# Patient Record
Sex: MALE | Race: WHITE | NOT HISPANIC OR LATINO
[De-identification: names, ages, dates, MRNs, and addresses within clinical notes are randomized per-mention and may not be internally consistent; named-entity substitution may affect disease eponyms.]

---

## 2018-10-23 ENCOUNTER — RX RENEWAL (OUTPATIENT)
Age: 73
End: 2018-10-23

## 2018-10-23 PROBLEM — Z00.00 ENCOUNTER FOR PREVENTIVE HEALTH EXAMINATION: Status: ACTIVE | Noted: 2018-10-23

## 2018-12-11 ENCOUNTER — APPOINTMENT (OUTPATIENT)
Dept: ENDOCRINOLOGY | Facility: CLINIC | Age: 73
End: 2018-12-11

## 2019-03-04 ENCOUNTER — MEDICATION RENEWAL (OUTPATIENT)
Age: 74
End: 2019-03-04

## 2019-03-26 ENCOUNTER — RECORD ABSTRACTING (OUTPATIENT)
Age: 74
End: 2019-03-26

## 2019-03-26 DIAGNOSIS — Z86.79 PERSONAL HISTORY OF OTHER DISEASES OF THE CIRCULATORY SYSTEM: ICD-10-CM

## 2019-03-26 DIAGNOSIS — Z79.4 LONG TERM (CURRENT) USE OF INSULIN: ICD-10-CM

## 2019-03-26 DIAGNOSIS — Z86.39 PERSONAL HISTORY OF OTHER ENDOCRINE, NUTRITIONAL AND METABOLIC DISEASE: ICD-10-CM

## 2019-03-26 DIAGNOSIS — Z87.39 PERSONAL HISTORY OF OTHER DISEASES OF THE MUSCULOSKELETAL SYSTEM AND CONNECTIVE TISSUE: ICD-10-CM

## 2019-03-26 DIAGNOSIS — Z78.9 OTHER SPECIFIED HEALTH STATUS: ICD-10-CM

## 2019-03-26 DIAGNOSIS — Z83.3 FAMILY HISTORY OF DIABETES MELLITUS: ICD-10-CM

## 2019-03-26 DIAGNOSIS — Z87.19 PERSONAL HISTORY OF OTHER DISEASES OF THE DIGESTIVE SYSTEM: ICD-10-CM

## 2019-03-26 DIAGNOSIS — Z80.9 FAMILY HISTORY OF MALIGNANT NEOPLASM, UNSPECIFIED: ICD-10-CM

## 2019-03-26 DIAGNOSIS — Z87.898 PERSONAL HISTORY OF OTHER SPECIFIED CONDITIONS: ICD-10-CM

## 2019-03-26 DIAGNOSIS — Z87.448 PERSONAL HISTORY OF OTHER DISEASES OF URINARY SYSTEM: ICD-10-CM

## 2019-03-26 DIAGNOSIS — Z87.09 PERSONAL HISTORY OF OTHER DISEASES OF THE RESPIRATORY SYSTEM: ICD-10-CM

## 2019-03-26 RX ORDER — TIOTROPIUM BROMIDE 18 UG/1
18 CAPSULE ORAL; RESPIRATORY (INHALATION)
Refills: 0 | Status: ACTIVE | COMMUNITY

## 2019-03-26 RX ORDER — ATORVASTATIN CALCIUM 10 MG/1
10 TABLET, FILM COATED ORAL
Refills: 0 | Status: ACTIVE | COMMUNITY

## 2019-03-27 ENCOUNTER — APPOINTMENT (OUTPATIENT)
Dept: ENDOCRINOLOGY | Facility: CLINIC | Age: 74
End: 2019-03-27
Payer: MEDICARE

## 2019-03-27 VITALS
BODY MASS INDEX: 33.18 KG/M2 | SYSTOLIC BLOOD PRESSURE: 112 MMHG | HEART RATE: 80 BPM | WEIGHT: 237 LBS | HEIGHT: 71 IN | OXYGEN SATURATION: 97 % | DIASTOLIC BLOOD PRESSURE: 70 MMHG

## 2019-03-27 LAB — GLUCOSE BLDC GLUCOMTR-MCNC: 178

## 2019-03-27 PROCEDURE — 99214 OFFICE O/P EST MOD 30 MIN: CPT

## 2019-03-27 RX ORDER — LANCETS 33 GAUGE
EACH MISCELLANEOUS
Qty: 1 | Refills: 2 | Status: ACTIVE | COMMUNITY
Start: 2019-03-27 | End: 1900-01-01

## 2019-03-28 ENCOUNTER — RESULT REVIEW (OUTPATIENT)
Age: 74
End: 2019-03-28

## 2019-03-28 LAB
BASOPHILS # BLD AUTO: 0.01 K/UL
BASOPHILS NFR BLD AUTO: 0.1 %
EOSINOPHIL # BLD AUTO: 0 K/UL
EOSINOPHIL NFR BLD AUTO: 0 %
HCT VFR BLD CALC: 37.5 %
HGB BLD-MCNC: 12 G/DL
IMM GRANULOCYTES NFR BLD AUTO: 0.5 %
LYMPHOCYTES # BLD AUTO: 1.22 K/UL
LYMPHOCYTES NFR BLD AUTO: 7.9 %
MAN DIFF?: NORMAL
MCHC RBC-ENTMCNC: 32 GM/DL
MCHC RBC-ENTMCNC: 32.7 PG
MCV RBC AUTO: 102.2 FL
MONOCYTES # BLD AUTO: 0.38 K/UL
MONOCYTES NFR BLD AUTO: 2.5 %
NEUTROPHILS # BLD AUTO: 13.71 K/UL
NEUTROPHILS NFR BLD AUTO: 89 %
PLATELET # BLD AUTO: 267 K/UL
RBC # BLD: 3.67 M/UL
RBC # FLD: 12.4 %
WBC # FLD AUTO: 15.4 K/UL

## 2019-03-28 NOTE — ASSESSMENT
[FreeTextEntry1] : 75 y/o male with Type 2 DM, HTN, and Hyperlipidemia. Labs reviewed from 3/26/19 - A1C 6.9%\par \par Plan: \par Type 2 DM: labs now  \par - continue current medication regimen: Toujeo U300 in am  if blood sugar <100 20 units, 100-130 25 units, > 130 30 units\par Glipizide 5 mg daily \par - if blood sugar > 200 give 34 units of Toujeo, while on Prednisone\par - send in logs in 1 week\par - continue self BS monitoring\par - educated on healthy food choices\par \par HTN: stable. continue Losartan Potassium\par \par Hyperlipidemia: monitor labs, continue Atorvastatin\par \par Labs and follow up visit in 3 months.

## 2019-03-28 NOTE — PHYSICAL EXAM
[Alert] : alert [No Acute Distress] : no acute distress [Well Nourished] : well nourished [Well Developed] : well developed [Normal Sclera/Conjunctiva] : normal sclera/conjunctiva [EOMI] : extra ocular movement intact [Normal Hearing] : hearing was normal [Supple] : the neck was supple [No LAD] : no lymphadenopathy [Thyroid Not Enlarged] : the thyroid was not enlarged [No Thyroid Nodules] : there were no palpable thyroid nodules [Normal Rate and Effort] : normal respiratory rhythm and effort [No Accessory Muscle Use] : no accessory muscle use [Clear to Auscultation] : lungs were clear to auscultation bilaterally [Normal Rate] : heart rate was normal  [Normal S1, S2] : normal S1 and S2 [Regular Rhythm] : with a regular rhythm [Pedal Pulses Normal] : the pedal pulses are present [Normal Bowel Sounds] : normal bowel sounds [Not Tender] : non-tender [Soft] : abdomen soft [Acanthosis Nigricans] : no acanthosis nigricans [Right Foot Was Examined] : right foot ~C was examined [Left Foot Was Examined] : left foot ~C was examined [Swelling] : swollen [Tenderness] : not tender [Erythema] : not erythematous [Normal] : normal [Diminished Throughout Both Feet] : normal tactile sensation with monofilament testing throughout both feet [No Tremors] : no tremors [Oriented x3] : oriented to person, place, and time [Normal Insight/Judgement] : insight and judgment were intact [Normal Mood] : the mood was normal [de-identified] : 1+ edema in right foot  [FreeTextEntry1] : 1+ edema

## 2019-03-28 NOTE — REVIEW OF SYSTEMS
[SOB on Exertion] : shortness of breath during exertion [Polyuria] : polyuria [Fatigue] : no fatigue [Decreased Appetite] : appetite not decreased [Recent Weight Gain (___ Lbs)] : no recent weight gain [Recent Weight Loss (___ Lbs)] : no recent weight loss [Visual Field Defect] : no visual field defect [Blurry Vision] : no blurred vision [Dysphagia] : no dysphagia [Dysphonia] : no dysphonia [Neck Pain] : no neck pain [Chest Pain] : no chest pain [Palpitations] : no palpitations [Constipation] : no constipation [Diarrhea] : no diarrhea [Dysuria] : no dysuria [Headache] : no headaches [Tremors] : no tremors [Depression] : no depression [Anxiety] : no anxiety [Polydipsia] : no polydipsia [Cold Intolerance] : cold tolerant [Heat Intolerance] : heat tolerant [Easy Bruising] : no tendency for easy bruising [Swelling] : no swelling [FreeTextEntry2] : weight stable  [FreeTextEntry6] : hx of emphysema

## 2019-03-28 NOTE — HISTORY OF PRESENT ILLNESS
[FreeTextEntry1] : Pt. was hospitalized for 3 days at [facility] for gout. The gout was in his right foot, knee and hip. He was discharged on a tapering dose of Prednisone. He has 5 more days of Prednisone to complete. Symptoms have improved since started Prednisone. \par \par Quality: Type 2 DM\par Severity: uncontrolled\par Duration: since 1990s\par Onset: fatigue\par Modifying Factors: Worse with diet \par Associated Symptoms: nephropathy. neuropathy\par \par Current Regimen:\par Toujeo U300 in am  if blood sugar <100 20 units, 100-130 25 units, > 130 30 units\par Glipizide 5 mg daily \par \par Self blood sugar monitoring: once a day in am, per meter\par 136, 130, 136, 152, 102, 226 \par \par exercise: none\par \par Diet:\par B- cereal, lacta free milk, tea, eggs, whole grain toast\par L- fast foods, \par D- slice of pizza\par Snacks- apple with peanut butter, small cake \par \par Date of last eye exam: years ago (-) diabetic retinopathy \par Date of last foot exam: in office only \par Date of last flu vaccine: unsure\par Date of last Pneumovax: < 5 years ago

## 2019-03-29 ENCOUNTER — RESULT REVIEW (OUTPATIENT)
Age: 74
End: 2019-03-29

## 2019-03-29 LAB
ALBUMIN SERPL ELPH-MCNC: 4.3 G/DL
ALP BLD-CCNC: 69 U/L
ALT SERPL-CCNC: 44 U/L
ANION GAP SERPL CALC-SCNC: 19 MMOL/L
AST SERPL-CCNC: 25 U/L
BILIRUB SERPL-MCNC: 0.4 MG/DL
BUN SERPL-MCNC: 63 MG/DL
CALCIUM SERPL-MCNC: 9.8 MG/DL
CHLORIDE SERPL-SCNC: 106 MMOL/L
CHOLEST SERPL-MCNC: 115 MG/DL
CHOLEST/HDLC SERPL: 2.4 RATIO
CO2 SERPL-SCNC: 17 MMOL/L
CREAT SERPL-MCNC: 2.68 MG/DL
CREAT SPEC-SCNC: 43 MG/DL
ESTIMATED AVERAGE GLUCOSE: 154 MG/DL
GLUCOSE SERPL-MCNC: 216 MG/DL
HBA1C MFR BLD HPLC: 7 %
HDLC SERPL-MCNC: 49 MG/DL
LDLC SERPL CALC-MCNC: 47 MG/DL
MICROALBUMIN 24H UR DL<=1MG/L-MCNC: 2.1 MG/DL
MICROALBUMIN/CREAT 24H UR-RTO: 48 MG/G
POTASSIUM SERPL-SCNC: 4.4 MMOL/L
PROT SERPL-MCNC: 7.1 G/DL
SODIUM SERPL-SCNC: 142 MMOL/L
TRIGL SERPL-MCNC: 96 MG/DL
TSH SERPL-ACNC: 1.95 UIU/ML

## 2019-04-11 ENCOUNTER — MEDICATION RENEWAL (OUTPATIENT)
Age: 74
End: 2019-04-11

## 2019-06-25 ENCOUNTER — APPOINTMENT (OUTPATIENT)
Dept: ENDOCRINOLOGY | Facility: CLINIC | Age: 74
End: 2019-06-25

## 2019-07-01 ENCOUNTER — MEDICATION RENEWAL (OUTPATIENT)
Age: 74
End: 2019-07-01

## 2019-07-01 ENCOUNTER — CLINICAL ADVICE (OUTPATIENT)
Age: 74
End: 2019-07-01

## 2019-07-03 ENCOUNTER — RX RENEWAL (OUTPATIENT)
Age: 74
End: 2019-07-03

## 2019-08-12 LAB
ALBUMIN SERPL ELPH-MCNC: 4.5 G/DL
ALP BLD-CCNC: 74 U/L
ALT SERPL-CCNC: 15 U/L
ANION GAP SERPL CALC-SCNC: 13 MMOL/L
AST SERPL-CCNC: 14 U/L
BASOPHILS # BLD AUTO: 0.07 K/UL
BASOPHILS NFR BLD AUTO: 0.6 %
BILIRUB SERPL-MCNC: 0.6 MG/DL
BUN SERPL-MCNC: 53 MG/DL
CALCIUM SERPL-MCNC: 9.8 MG/DL
CHLORIDE SERPL-SCNC: 111 MMOL/L
CHOLEST SERPL-MCNC: 88 MG/DL
CHOLEST/HDLC SERPL: 2.4 RATIO
CO2 SERPL-SCNC: 19 MMOL/L
CREAT SERPL-MCNC: 2.59 MG/DL
CREAT SPEC-SCNC: 97 MG/DL
EOSINOPHIL # BLD AUTO: 0.56 K/UL
EOSINOPHIL NFR BLD AUTO: 4.8 %
ESTIMATED AVERAGE GLUCOSE: 209 MG/DL
GLUCOSE SERPL-MCNC: 126 MG/DL
HBA1C MFR BLD HPLC: 8.9 %
HCT VFR BLD CALC: 39.2 %
HDLC SERPL-MCNC: 37 MG/DL
HGB BLD-MCNC: 12.6 G/DL
IMM GRANULOCYTES NFR BLD AUTO: 0.3 %
LDLC SERPL CALC-MCNC: 41 MG/DL
LYMPHOCYTES # BLD AUTO: 3.01 K/UL
LYMPHOCYTES NFR BLD AUTO: 25.7 %
MAN DIFF?: NORMAL
MCHC RBC-ENTMCNC: 31.8 PG
MCHC RBC-ENTMCNC: 32.1 GM/DL
MCV RBC AUTO: 99 FL
MICROALBUMIN 24H UR DL<=1MG/L-MCNC: 4.1 MG/DL
MICROALBUMIN/CREAT 24H UR-RTO: 42 MG/G
MONOCYTES # BLD AUTO: 1 K/UL
MONOCYTES NFR BLD AUTO: 8.5 %
NEUTROPHILS # BLD AUTO: 7.02 K/UL
NEUTROPHILS NFR BLD AUTO: 60.1 %
PLATELET # BLD AUTO: 223 K/UL
POTASSIUM SERPL-SCNC: 4.8 MMOL/L
PROT SERPL-MCNC: 6.9 G/DL
RBC # BLD: 3.96 M/UL
RBC # FLD: 14.6 %
SODIUM SERPL-SCNC: 143 MMOL/L
TRIGL SERPL-MCNC: 52 MG/DL
TSH SERPL-ACNC: 2.04 UIU/ML
WBC # FLD AUTO: 11.7 K/UL

## 2019-08-13 ENCOUNTER — APPOINTMENT (OUTPATIENT)
Dept: ENDOCRINOLOGY | Facility: CLINIC | Age: 74
End: 2019-08-13
Payer: MEDICARE

## 2019-08-13 VITALS
BODY MASS INDEX: 33.6 KG/M2 | SYSTOLIC BLOOD PRESSURE: 112 MMHG | HEIGHT: 71 IN | OXYGEN SATURATION: 95 % | DIASTOLIC BLOOD PRESSURE: 70 MMHG | HEART RATE: 86 BPM | WEIGHT: 240 LBS

## 2019-08-13 LAB — GLUCOSE BLDC GLUCOMTR-MCNC: 142

## 2019-08-13 PROCEDURE — 99214 OFFICE O/P EST MOD 30 MIN: CPT | Mod: 25

## 2019-08-13 PROCEDURE — 82962 GLUCOSE BLOOD TEST: CPT

## 2019-08-13 RX ORDER — FUROSEMIDE 40 MG/1
40 TABLET ORAL
Refills: 0 | Status: DISCONTINUED | COMMUNITY
End: 2019-08-13

## 2019-08-13 RX ORDER — COLCHICINE 0.6 MG/1
0.6 CAPSULE ORAL
Refills: 0 | Status: DISCONTINUED | COMMUNITY
End: 2019-08-13

## 2019-08-13 NOTE — HISTORY OF PRESENT ILLNESS
[FreeTextEntry1] : Patient is seen today for a routine diabetic follow up.\par Quality:  type 2 DM\par Severity:  moderate\par Duration of diabetes:  since the 1990s\par Associated Complications/ Symptoms:  nephropathy, neuropathy\par Modifying Factors:  Better with medication\par \par Current Diabetic Medication Regimen:\par Touejo 30 units qhs  (will take 34 units if BG over 200 mg/dl)\par Glipizide 5 mg daily (not taking twice a day as instructed)\par Came off Januvia in the past due to cost.  \par \par Still having episodes of gout so stopped taking lasix and now c/o edema.  Just finished another prednisone taper.  \par Lost drug coverage and now paying out of pocket for Rx.  \par \par SMBG:  testing once a day.  No recent hypoglycemia.

## 2019-08-13 NOTE — REVIEW OF SYSTEMS
[Recent Weight Gain (___ Lbs)] : recent [unfilled] ~Ulb weight gain [Shortness Of Breath] : shortness of breath [Pain/Numbness of Digits] : pain/numbness of digits [Chest Pain] : no chest pain [Nausea] : no nausea

## 2019-08-13 NOTE — ASSESSMENT
[FreeTextEntry1] : 74 year old male with type 2 DM with associated nephropathy, HTN and hyperlipidemia.  His glycemic control has worsened, likely due to recent prednisone use.    \par \par 1.  Type 2 DM-  Increase Glipizide to 5 mg BID.   Apply for patient assistance for Toujeo to help with cost.  \par 2.  Hyperlipidemia-  continue Atorvastatin\par 3.  HTN-  continue losartan\par Recommended podiatry eval for foot care.

## 2019-08-13 NOTE — PHYSICAL EXAM
[No Acute Distress] : no acute distress [Well Nourished] : well nourished [Well Developed] : well developed [Normal Sclera/Conjunctiva] : normal sclera/conjunctiva [No Proptosis] : no proptosis [No Neck Mass] : no neck mass was observed [No LAD] : no lymphadenopathy [Thyroid Not Enlarged] : the thyroid was not enlarged [No Thyroid Nodules] : there were no palpable thyroid nodules [No Respiratory Distress] : no respiratory distress [Clear to Auscultation] : lungs were clear to auscultation bilaterally [Normal PMI] : the apical impulse was normal [Normal S1, S2] : normal S1 and S2 [Regular Rhythm] : with a regular rhythm [Murmurs] : no murmurs [Right Foot Was Examined] : right foot ~C was examined [Left Foot Was Examined] : left foot ~C was examined [2+] : 2+ in the dorsalis pedis [Diminished Throughout Both Feet] : diminished tactile sensation with monofilament testing throughout both feet [Normal Insight/Judgement] : insight and judgment were intact [Normal Affect] : the affect was normal [Normal Mood] : the mood was normal [Acanthosis Nigricans] : no acanthosis nigricans [de-identified] : + 1+ pitting edema b/l LEs [FreeTextEntry1] : Dry skin and callous [FreeTextEntry2] : dystrophic toe nails [FreeTextEntry5] : Dry skin and callous [FreeTextEntry6] : dystrophic toe nails

## 2019-10-04 ENCOUNTER — RX RENEWAL (OUTPATIENT)
Age: 74
End: 2019-10-04

## 2019-10-07 ENCOUNTER — APPOINTMENT (OUTPATIENT)
Dept: ENDOCRINOLOGY | Facility: CLINIC | Age: 74
End: 2019-10-07
Payer: MEDICARE

## 2019-10-07 VITALS
WEIGHT: 231 LBS | BODY MASS INDEX: 32.34 KG/M2 | SYSTOLIC BLOOD PRESSURE: 110 MMHG | HEIGHT: 71 IN | HEART RATE: 90 BPM | DIASTOLIC BLOOD PRESSURE: 68 MMHG

## 2019-10-07 LAB — GLUCOSE BLDC GLUCOMTR-MCNC: 139

## 2019-10-07 PROCEDURE — 99214 OFFICE O/P EST MOD 30 MIN: CPT | Mod: 25

## 2019-10-07 PROCEDURE — 95250 CONT GLUC MNTR PHYS/QHP EQP: CPT

## 2019-10-07 PROCEDURE — 82962 GLUCOSE BLOOD TEST: CPT

## 2019-10-07 RX ORDER — ASPIRIN 81 MG
81 TABLET, DELAYED RELEASE (ENTERIC COATED) ORAL
Refills: 0 | Status: DISCONTINUED | COMMUNITY
End: 2019-10-07

## 2019-10-07 RX ORDER — PREDNISONE 10 MG/1
10 TABLET ORAL
Refills: 0 | Status: ACTIVE | COMMUNITY

## 2019-10-07 RX ORDER — PSYLLIUM HUSK 0.4 G
CAPSULE ORAL
Refills: 0 | Status: DISCONTINUED | COMMUNITY
End: 2019-10-07

## 2019-10-07 RX ORDER — LOSARTAN POTASSIUM 50 MG/1
50 TABLET, FILM COATED ORAL DAILY
Refills: 0 | Status: ACTIVE | COMMUNITY

## 2019-10-07 RX ORDER — MULTIVITAMIN
TABLET ORAL
Refills: 0 | Status: DISCONTINUED | COMMUNITY
End: 2019-10-07

## 2019-10-07 RX ORDER — ALBUTEROL SULFATE 90 UG/1
INHALANT RESPIRATORY (INHALATION)
Refills: 0 | Status: ACTIVE | COMMUNITY

## 2019-10-07 RX ORDER — ASCORBIC ACID 500 MG
500 TABLET ORAL
Refills: 0 | Status: DISCONTINUED | COMMUNITY
End: 2019-10-07

## 2019-10-07 RX ORDER — FUROSEMIDE 40 MG/1
40 TABLET ORAL DAILY
Refills: 0 | Status: ACTIVE | COMMUNITY

## 2019-10-07 RX ORDER — VITAMIN K2 90 MCG
125 MCG CAPSULE ORAL
Refills: 0 | Status: DISCONTINUED | COMMUNITY
End: 2019-10-07

## 2019-10-07 RX ORDER — FLASH GLUCOSE SENSOR
KIT MISCELLANEOUS
Qty: 0 | Refills: 0 | Status: COMPLETED | OUTPATIENT
Start: 2019-10-07

## 2019-10-07 RX ADMIN — Medication 0: at 00:00

## 2019-10-07 NOTE — HISTORY OF PRESENT ILLNESS
[FreeTextEntry1] : Pt. was hospitalized at [facility] from 8/31/19-9/12/19. He was sent to the hospital due to the inability to get out of bed along with fecal incontinence for 2 days. During the hospitalization he was treated for pseudo gout with Prednisone then developed DKA - resulting in being transferred to the ICU for 1 week. He was discharged to [facility] for weakness and stayed in the rehab for 3 weeks. \par \par Quality: Type 2 DM\par Severity: uncontrolled\par Duration: since 1990s\par Onset: fatigue\par Modifying Factors: Worse with diet \par Associated Symptoms: nephropathy. neuropathy\par \par Current Regimen:\par Toujeo U300 in am  if blood sugar <150 20 units, 150-200 25 units, > 250 30 units, > 300 34 units - if blood sugar less then 70 will give insulin later in the day when number rises \par Glipizide 5 mg BID - only when on prednisone along with high blood sugar \par \par Self blood sugar monitoring: once a day in am, no meter, no logs \par per pt. blood sugars have been low 70s, 80s, 103 \par \par exercise: none\par \par Diet:\par B- tea, eggs, biscuit with clancy \par L- tuna fish sandwich, salad from [employer] fast [employer] \par D- same as lunch, turkey sandwich \par Snacks- salad\par \par Date of last eye exam: 9/2019 (-) diabetic retinopathy \par Date of last foot exam: in office only \par Date of last flu vaccine: unsure\par Date of last Pneumovax: < 5 years ago

## 2019-10-07 NOTE — REASON FOR VISIT
[Post Hospitalization] : a post hospitalization visit [FreeTextEntry1] : Type 2 DM, HTN, and Hyperlipidemia

## 2019-10-07 NOTE — PHYSICAL EXAM
[Alert] : alert [No Acute Distress] : no acute distress [Well Nourished] : well nourished [Well Developed] : well developed [Normal Sclera/Conjunctiva] : normal sclera/conjunctiva [EOMI] : extra ocular movement intact [Supple] : the neck was supple [Normal Hearing] : hearing was normal [No LAD] : no lymphadenopathy [Thyroid Not Enlarged] : the thyroid was not enlarged [No Thyroid Nodules] : there were no palpable thyroid nodules [No Accessory Muscle Use] : no accessory muscle use [Normal Rate and Effort] : normal respiratory rhythm and effort [Clear to Auscultation] : lungs were clear to auscultation bilaterally [Normal Rate] : heart rate was normal  [Normal S1, S2] : normal S1 and S2 [Pedal Pulses Normal] : the pedal pulses are present [Regular Rhythm] : with a regular rhythm [Normal Bowel Sounds] : normal bowel sounds [Not Tender] : non-tender [Soft] : abdomen soft [No Rash] : no rash [Right Foot Was Examined] : right foot ~C was examined [Left Foot Was Examined] : left foot ~C was examined [Swelling] : swollen [Normal] : normal [No Tremors] : no tremors [Oriented x3] : oriented to person, place, and time [Normal Insight/Judgement] : insight and judgment were intact [Normal Mood] : the mood was normal [Acanthosis Nigricans] : no acanthosis nigricans [Foot Ulcers] : no foot ulcers [Erythema] : not erythematous [Tenderness] : not tender [Diminished Throughout Both Feet] : diminished tactile sensation with monofilament testing throughout both feet [de-identified] : 1+ edema in right foot and trace of edema in left ankle  [de-identified] : ambulates with rolling walker  [FreeTextEntry1] : 1+ edema

## 2019-10-07 NOTE — ASSESSMENT
[FreeTextEntry1] : 73 y/o male with Type 2 DM, HTN, and Hyperlipidemia. \par \par Plan: \par Type 2 DM: \par - david professional placed \par - decrease Toujeo U300 to 16 units in am\par - decrease Glipizide 5 mg daily - before dinner  \par - if blood sugar > 200 call the office \par - continue self BS monitoring\par - educated on healthy food choices\par - schedule appointment with CDE for diet education and david download \par \par HTN: stable. continue Losartan Potassium\par \par Hyperlipidemia: monitor lipids, continue Atorvastatin\par \par Labs and follow up visit in 6-8 weeks with Dr. Weinberg.

## 2019-10-07 NOTE — REVIEW OF SYSTEMS
[Fatigue] : fatigue [Recent Weight Loss (___ Lbs)] : recent [unfilled] ~Ulb weight loss [Blurry Vision] : blurred vision [Polydipsia] : polydipsia [Swelling] : swelling [Decreased Appetite] : appetite not decreased [Visual Field Defect] : no visual field defect [Dysphagia] : no dysphagia [Dysphonia] : no dysphonia [Neck Pain] : no neck pain [Chest Pain] : no chest pain [Palpitations] : no palpitations [Constipation] : no constipation [Diarrhea] : no diarrhea [Polyuria] : no polyuria [Tremors] : no tremors [Dysuria] : no dysuria [Headache] : no headaches [Depression] : no depression [Anxiety] : no anxiety [Cold Intolerance] : cold tolerant [Heat Intolerance] : heat tolerant [Easy Bruising] : no tendency for easy bruising [FreeTextEntry3] : when sugar was high for 1 day  [de-identified] : both ankles

## 2019-10-11 ENCOUNTER — RESULT REVIEW (OUTPATIENT)
Age: 74
End: 2019-10-11

## 2019-10-22 ENCOUNTER — RESULT CHARGE (OUTPATIENT)
Age: 74
End: 2019-10-22

## 2019-10-22 ENCOUNTER — APPOINTMENT (OUTPATIENT)
Dept: ENDOCRINOLOGY | Facility: CLINIC | Age: 74
End: 2019-10-22
Payer: MEDICARE

## 2019-10-22 PROCEDURE — G0108 DIAB MANAGE TRN  PER INDIV: CPT

## 2019-10-29 ENCOUNTER — RESULT REVIEW (OUTPATIENT)
Age: 74
End: 2019-10-29

## 2019-10-30 ENCOUNTER — MEDICATION RENEWAL (OUTPATIENT)
Age: 74
End: 2019-10-30

## 2019-10-30 RX ORDER — BLOOD-GLUCOSE METER
W/DEVICE EACH MISCELLANEOUS
Qty: 1 | Refills: 0 | Status: ACTIVE | COMMUNITY
Start: 2019-10-30 | End: 1900-01-01

## 2019-11-01 ENCOUNTER — LABORATORY RESULT (OUTPATIENT)
Age: 74
End: 2019-11-01

## 2019-11-06 ENCOUNTER — APPOINTMENT (OUTPATIENT)
Dept: ENDOCRINOLOGY | Facility: CLINIC | Age: 74
End: 2019-11-06
Payer: MEDICARE

## 2019-11-06 PROCEDURE — 97803 MED NUTRITION INDIV SUBSEQ: CPT

## 2019-11-15 ENCOUNTER — APPOINTMENT (OUTPATIENT)
Dept: ENDOCRINOLOGY | Facility: CLINIC | Age: 74
End: 2019-11-15
Payer: MEDICARE

## 2019-11-15 PROCEDURE — G0108 DIAB MANAGE TRN  PER INDIV: CPT

## 2019-12-03 ENCOUNTER — APPOINTMENT (OUTPATIENT)
Dept: ENDOCRINOLOGY | Facility: CLINIC | Age: 74
End: 2019-12-03

## 2019-12-04 ENCOUNTER — APPOINTMENT (OUTPATIENT)
Dept: ENDOCRINOLOGY | Facility: CLINIC | Age: 74
End: 2019-12-04
Payer: MEDICARE

## 2019-12-04 VITALS
BODY MASS INDEX: 32.2 KG/M2 | DIASTOLIC BLOOD PRESSURE: 64 MMHG | SYSTOLIC BLOOD PRESSURE: 104 MMHG | HEIGHT: 71 IN | HEART RATE: 86 BPM | WEIGHT: 230 LBS

## 2019-12-04 LAB — GLUCOSE BLDC GLUCOMTR-MCNC: 384

## 2019-12-04 PROCEDURE — 82962 GLUCOSE BLOOD TEST: CPT

## 2019-12-04 PROCEDURE — 99214 OFFICE O/P EST MOD 30 MIN: CPT | Mod: 25

## 2019-12-04 RX ORDER — GLIPIZIDE 5 MG/1
5 TABLET ORAL TWICE DAILY
Qty: 180 | Refills: 1 | Status: DISCONTINUED | COMMUNITY
Start: 2019-03-04 | End: 2019-12-04

## 2019-12-04 NOTE — PHYSICAL EXAM
[No Acute Distress] : no acute distress [Well Nourished] : well nourished [Well Developed] : well developed [No Neck Mass] : no neck mass was observed [No Proptosis] : no proptosis [Normal Sclera/Conjunctiva] : normal sclera/conjunctiva [No LAD] : no lymphadenopathy [Thyroid Not Enlarged] : the thyroid was not enlarged [No Thyroid Nodules] : there were no palpable thyroid nodules [No Respiratory Distress] : no respiratory distress [Clear to Auscultation] : lungs were clear to auscultation bilaterally [Regular Rhythm] : with a regular rhythm [Normal S1, S2] : normal S1 and S2 [Normal PMI] : the apical impulse was normal [No Edema] : there was no peripheral edema [Murmurs] : no murmurs [Normal Insight/Judgement] : insight and judgment were intact [Normal Affect] : the affect was normal [Normal Mood] : the mood was normal [Acanthosis Nigricans] : no acanthosis nigricans

## 2019-12-04 NOTE — ASSESSMENT
[FreeTextEntry1] : 74 year old male with type 2 DM with associated nephropathy, HTN and hyperlipidemia.    His glycemic control has worsened in setting of prednisone use.  \par \par 1.  Type 2 DM-  increase Toujeo to 34 units qhs, increase Humalog to 18 units with meals + scale of 2: 50 over 150. \par 2.  Hyperlipidemia-  continue Atorvastatin\par 3.  HTN-  continue losartan\par

## 2019-12-04 NOTE — REVIEW OF SYSTEMS
[Shortness Of Breath] : shortness of breath [Joint Pain] : joint pain [Chest Pain] : no chest pain [Recent Weight Gain (___ Lbs)] : no recent weight gain [Recent Weight Loss (___ Lbs)] : no recent weight loss [Nausea] : no nausea

## 2019-12-17 ENCOUNTER — APPOINTMENT (OUTPATIENT)
Dept: RHEUMATOLOGY | Facility: CLINIC | Age: 74
End: 2019-12-17

## 2019-12-24 ENCOUNTER — MEDICATION RENEWAL (OUTPATIENT)
Age: 74
End: 2019-12-24

## 2020-01-09 ENCOUNTER — RX RENEWAL (OUTPATIENT)
Age: 75
End: 2020-01-09

## 2020-01-21 ENCOUNTER — APPOINTMENT (OUTPATIENT)
Dept: ENDOCRINOLOGY | Facility: CLINIC | Age: 75
End: 2020-01-21

## 2020-02-26 ENCOUNTER — RX RENEWAL (OUTPATIENT)
Age: 75
End: 2020-02-26

## 2020-04-14 ENCOUNTER — APPOINTMENT (OUTPATIENT)
Dept: ENDOCRINOLOGY | Facility: CLINIC | Age: 75
End: 2020-04-14
Payer: MEDICARE

## 2020-04-14 PROCEDURE — 99442: CPT

## 2020-04-30 ENCOUNTER — RX RENEWAL (OUTPATIENT)
Age: 75
End: 2020-04-30

## 2020-06-01 ENCOUNTER — RX RENEWAL (OUTPATIENT)
Age: 75
End: 2020-06-01

## 2020-07-13 ENCOUNTER — RX RENEWAL (OUTPATIENT)
Age: 75
End: 2020-07-13

## 2020-07-20 ENCOUNTER — RX RENEWAL (OUTPATIENT)
Age: 75
End: 2020-07-20

## 2020-07-20 RX ORDER — PEN NEEDLE, DIABETIC 29 G X1/2"
31G X 5 MM NEEDLE, DISPOSABLE MISCELLANEOUS
Qty: 400 | Refills: 1 | Status: ACTIVE | COMMUNITY
Start: 2018-10-23 | End: 1900-01-01

## 2020-07-20 RX ORDER — PEN NEEDLE, DIABETIC 29 G X1/2"
32G X 4 MM NEEDLE, DISPOSABLE MISCELLANEOUS
Qty: 100 | Refills: 0 | Status: ACTIVE | COMMUNITY
Start: 2019-10-22 | End: 1900-01-01

## 2020-08-19 ENCOUNTER — APPOINTMENT (OUTPATIENT)
Dept: ENDOCRINOLOGY | Facility: CLINIC | Age: 75
End: 2020-08-19
Payer: MEDICARE

## 2020-08-19 VITALS
HEIGHT: 71 IN | SYSTOLIC BLOOD PRESSURE: 124 MMHG | BODY MASS INDEX: 36.12 KG/M2 | WEIGHT: 258 LBS | DIASTOLIC BLOOD PRESSURE: 64 MMHG | HEART RATE: 89 BPM

## 2020-08-19 LAB — GLUCOSE BLDC GLUCOMTR-MCNC: 192

## 2020-08-19 PROCEDURE — 99214 OFFICE O/P EST MOD 30 MIN: CPT | Mod: 25

## 2020-08-19 PROCEDURE — 82962 GLUCOSE BLOOD TEST: CPT

## 2020-08-19 PROCEDURE — 36415 COLL VENOUS BLD VENIPUNCTURE: CPT

## 2020-08-19 RX ORDER — INSULIN GLARGINE 300 U/ML
300 INJECTION, SOLUTION SUBCUTANEOUS
Qty: 4.5 | Refills: 1 | Status: ACTIVE | COMMUNITY
Start: 2020-06-01 | End: 1900-01-01

## 2020-08-19 NOTE — HISTORY OF PRESENT ILLNESS
[FreeTextEntry1] : Follow up DM and hyperlipidemia\par \par Quality:  type 2 DM\par Severity:  moderate\par Duration of diabetes:  since the 1990s\par Associated Complications/ Symptoms:  nephropathy, neuropathy, had DKA in setting of prednisone use\par Modifying Factors:  Better with medication, worsened by prednisone use for gout.  \par \par SMBG:  testing 3-4 times a day.  Reviewed glucometer and BG much improved, but has some episodes of hypoglycemia.  \par \par Current Diabetic Medication Regimen:\par Touejo 34 units qAM  \par Humalog 16 units with meals + scale (2:50 over 150)\par Came off Januvia in the past due to cost.  \par  \par Admits that he was struggling with depression during Quarantine, but now mood has improved.   Has gained some weight.

## 2020-08-19 NOTE — REVIEW OF SYSTEMS
[Recent Weight Gain (___ Lbs)] : recent weight gain: [unfilled] lbs [Shortness Of Breath] : shortness of breath [Pain/Numbness of Digits] : pain/numbness of digits [Chest Pain] : no chest pain

## 2020-08-19 NOTE — PHYSICAL EXAM
[Obese] : obese [No Acute Distress] : no acute distress [Normal Sclera/Conjunctiva] : normal sclera/conjunctiva [No Proptosis] : no proptosis [No Neck Mass] : no neck mass was observed [No LAD] : no lymphadenopathy [Supple] : the neck was supple [Thyroid Not Enlarged] : the thyroid was not enlarged [No Thyroid Nodules] : no palpable thyroid nodules [No Respiratory Distress] : no respiratory distress [Clear to Auscultation] : lungs were clear to auscultation bilaterally [Normal S1, S2] : normal S1 and S2 [No Murmurs] : no murmurs [Normal Rate] : heart rate was normal [Regular Rhythm] : with a regular rhythm [Right Foot Was Examined] : right foot ~C was examined [Left Foot Was Examined] : left foot ~C was examined [1+] : 1+ in the dorsalis pedis [Diminished Throughout Both Feet] : diminished tactile sensation with monofilament testing throughout both feet [Normal Affect] : the affect was normal [Normal Insight/Judgement] : insight and judgment were intact [Normal Mood] : the mood was normal [Acanthosis Nigricans] : no acanthosis nigricans [FreeTextEntry1] : Callus [FreeTextEntry2] : Onychomycosis [FreeTextEntry5] : Callus [FreeTextEntry6] : Onychomycosis

## 2020-08-19 NOTE — ASSESSMENT
[FreeTextEntry1] : 75 year old male with type 2 DM with associated nephropathy, HTN and hyperlipidemia.     His glycemic control is improving.\par \par 1.  Type 2 DM-  Reduce toujeo to 30 units daily to reduce the risk of hypoglycemia.   Check A1c now.  \par 2.  Hyperlipidemia-  continue Atorvastatin.  Check lipids\par 3.  HTN-  continue losartan\par

## 2020-08-21 LAB
ALBUMIN SERPL ELPH-MCNC: 4.3 G/DL
ALP BLD-CCNC: 62 U/L
ALT SERPL-CCNC: 15 U/L
ANION GAP SERPL CALC-SCNC: 14 MMOL/L
AST SERPL-CCNC: 16 U/L
BASOPHILS # BLD AUTO: 0.04 K/UL
BASOPHILS NFR BLD AUTO: 0.3 %
BILIRUB SERPL-MCNC: 0.5 MG/DL
BUN SERPL-MCNC: 73 MG/DL
CALCIUM SERPL-MCNC: 9.8 MG/DL
CHLORIDE SERPL-SCNC: 102 MMOL/L
CHOLEST SERPL-MCNC: 119 MG/DL
CHOLEST/HDLC SERPL: 2.7 RATIO
CO2 SERPL-SCNC: 26 MMOL/L
CREAT SERPL-MCNC: 2.58 MG/DL
EOSINOPHIL # BLD AUTO: 0.08 K/UL
EOSINOPHIL NFR BLD AUTO: 0.6 %
ESTIMATED AVERAGE GLUCOSE: 166 MG/DL
GLUCOSE SERPL-MCNC: 158 MG/DL
HBA1C MFR BLD HPLC: 7.4 %
HCT VFR BLD CALC: 44.6 %
HDLC SERPL-MCNC: 45 MG/DL
HGB BLD-MCNC: 13.9 G/DL
IMM GRANULOCYTES NFR BLD AUTO: 0.8 %
LDLC SERPL CALC-MCNC: 43 MG/DL
LYMPHOCYTES # BLD AUTO: 1.08 K/UL
LYMPHOCYTES NFR BLD AUTO: 7.8 %
MAN DIFF?: NORMAL
MCHC RBC-ENTMCNC: 31 PG
MCHC RBC-ENTMCNC: 31.2 GM/DL
MCV RBC AUTO: 99.6 FL
MONOCYTES # BLD AUTO: 0.61 K/UL
MONOCYTES NFR BLD AUTO: 4.4 %
NEUTROPHILS # BLD AUTO: 11.88 K/UL
NEUTROPHILS NFR BLD AUTO: 86.1 %
PLATELET # BLD AUTO: 225 K/UL
POTASSIUM SERPL-SCNC: 4.8 MMOL/L
PROT SERPL-MCNC: 6.8 G/DL
RBC # BLD: 4.48 M/UL
RBC # FLD: 13.7 %
SODIUM SERPL-SCNC: 142 MMOL/L
TRIGL SERPL-MCNC: 156 MG/DL
TSH SERPL-ACNC: 1.24 UIU/ML
WBC # FLD AUTO: 13.8 K/UL

## 2020-09-08 ENCOUNTER — APPOINTMENT (OUTPATIENT)
Dept: ENDOCRINOLOGY | Facility: CLINIC | Age: 75
End: 2020-09-08
Payer: MEDICARE

## 2020-09-08 PROCEDURE — 95249 CONT GLUC MNTR PT PROV EQP: CPT

## 2020-09-08 PROCEDURE — ZZZZZ: CPT

## 2020-09-29 ENCOUNTER — APPOINTMENT (OUTPATIENT)
Dept: ENDOCRINOLOGY | Facility: CLINIC | Age: 75
End: 2020-09-29

## 2020-10-09 ENCOUNTER — APPOINTMENT (OUTPATIENT)
Dept: ENDOCRINOLOGY | Facility: CLINIC | Age: 75
End: 2020-10-09
Payer: MEDICARE

## 2020-10-09 PROCEDURE — G0108 DIAB MANAGE TRN  PER INDIV: CPT

## 2020-10-09 RX ORDER — INSULIN LISPRO 100 [IU]/ML
100 INJECTION, SOLUTION INTRAVENOUS; SUBCUTANEOUS
Qty: 2 | Refills: 3 | Status: ACTIVE | COMMUNITY
Start: 2019-10-22 | End: 1900-01-01

## 2020-11-02 ENCOUNTER — RX RENEWAL (OUTPATIENT)
Age: 75
End: 2020-11-02

## 2021-01-06 ENCOUNTER — APPOINTMENT (OUTPATIENT)
Dept: ENDOCRINOLOGY | Facility: CLINIC | Age: 76
End: 2021-01-06
Payer: MEDICARE

## 2021-01-06 VITALS
WEIGHT: 260 LBS | HEART RATE: 100 BPM | HEIGHT: 71 IN | DIASTOLIC BLOOD PRESSURE: 80 MMHG | SYSTOLIC BLOOD PRESSURE: 122 MMHG | OXYGEN SATURATION: 94 % | BODY MASS INDEX: 36.4 KG/M2

## 2021-01-06 LAB — GLUCOSE BLDC GLUCOMTR-MCNC: 270

## 2021-01-06 PROCEDURE — 95251 CONT GLUC MNTR ANALYSIS I&R: CPT

## 2021-01-06 PROCEDURE — 82962 GLUCOSE BLOOD TEST: CPT

## 2021-01-06 PROCEDURE — 99214 OFFICE O/P EST MOD 30 MIN: CPT | Mod: 25

## 2021-01-06 RX ORDER — ALLOPURINOL 100 MG/1
100 TABLET ORAL DAILY
Refills: 0 | Status: ACTIVE | COMMUNITY

## 2021-01-06 NOTE — ASSESSMENT
[FreeTextEntry1] : 75 year old male with type 2 DM with associated nephropathy, HTN and hyperlipidemia.     His glycemic control is reasonable. \par \par 1.  Type 2 DM-  Reduce Toujeo to 28 units daily to reduce the risk of hypoglycemia.     \par 2.  Hyperlipidemia-  continue Atorvastatin. \par 3.  HTN-  continue Losartan\par

## 2021-01-06 NOTE — DATA REVIEWED
[FreeTextEntry1] : LABS:\par 12/8/2020:\par 25(OH)D 42\par A1c 7/9%\par LDL  41\par Creatinine:  2.31

## 2021-01-06 NOTE — HISTORY OF PRESENT ILLNESS
[FreeTextEntry1] : Follow up DM and hyperlipidemia\par \par Quality:  type 2 DM\par Severity:  moderate\par Duration of diabetes:  since the 1990s\par Associated Complications/ Symptoms:  nephropathy, neuropathy, had DKA in setting of prednisone use\par Modifying Factors:  Better with medication, worsened by prednisone use for gout.  \par \par SMBG:  testing 4 times a day. \par Now using Freestyle Shirin CGM.  Average BG is 109 mg/dl with 97% of values with target range, 1% above range and 2% below range.  \par \par Current Diabetic Medication Regimen:\par Touejo 30 units qAM  \par Humalog 16 units with meals + scale (2:50 over 150)\par Came off Januvia in the past due to cost.  \par  \par Complains of difficulty losing weight on Prednisone.

## 2021-01-06 NOTE — PHYSICAL EXAM
[Obese] : obese [No Acute Distress] : no acute distress [Normal Sclera/Conjunctiva] : normal sclera/conjunctiva [No Proptosis] : no proptosis [No Neck Mass] : no neck mass was observed [No LAD] : no lymphadenopathy [Supple] : the neck was supple [Thyroid Not Enlarged] : the thyroid was not enlarged [No Thyroid Nodules] : no palpable thyroid nodules [No Respiratory Distress] : no respiratory distress [Clear to Auscultation] : lungs were clear to auscultation bilaterally [Normal S1, S2] : normal S1 and S2 [No Murmurs] : no murmurs [Normal Rate] : heart rate was normal [Regular Rhythm] : with a regular rhythm [Normal Affect] : the affect was normal [Normal Insight/Judgement] : insight and judgment were intact [Normal Mood] : the mood was normal [Acanthosis Nigricans] : no acanthosis nigricans

## 2021-01-06 NOTE — REVIEW OF SYSTEMS
[Recent Weight Gain (___ Lbs)] : recent weight gain: [unfilled] lbs [Palpitations] : palpitations [Shortness Of Breath] : shortness of breath

## 2021-04-28 ENCOUNTER — LABORATORY RESULT (OUTPATIENT)
Age: 76
End: 2021-04-28

## 2021-05-11 ENCOUNTER — APPOINTMENT (OUTPATIENT)
Dept: ENDOCRINOLOGY | Facility: CLINIC | Age: 76
End: 2021-05-11
Payer: MEDICARE

## 2021-05-11 VITALS
HEART RATE: 101 BPM | HEIGHT: 71 IN | BODY MASS INDEX: 36.4 KG/M2 | WEIGHT: 260 LBS | SYSTOLIC BLOOD PRESSURE: 130 MMHG | DIASTOLIC BLOOD PRESSURE: 88 MMHG

## 2021-05-11 DIAGNOSIS — I10 ESSENTIAL (PRIMARY) HYPERTENSION: ICD-10-CM

## 2021-05-11 DIAGNOSIS — E11.29 TYPE 2 DIABETES MELLITUS WITH OTHER DIABETIC KIDNEY COMPLICATION: ICD-10-CM

## 2021-05-11 DIAGNOSIS — Z79.4 TYPE 2 DIABETES MELLITUS WITH OTHER DIABETIC KIDNEY COMPLICATION: ICD-10-CM

## 2021-05-11 DIAGNOSIS — E78.2 MIXED HYPERLIPIDEMIA: ICD-10-CM

## 2021-05-11 LAB — GLUCOSE BLDC GLUCOMTR-MCNC: 291

## 2021-05-11 PROCEDURE — 36415 COLL VENOUS BLD VENIPUNCTURE: CPT

## 2021-05-11 PROCEDURE — 99214 OFFICE O/P EST MOD 30 MIN: CPT | Mod: 25

## 2021-05-11 PROCEDURE — 82962 GLUCOSE BLOOD TEST: CPT

## 2021-05-11 PROCEDURE — 95251 CONT GLUC MNTR ANALYSIS I&R: CPT

## 2021-05-11 NOTE — REVIEW OF SYSTEMS
[Recent Weight Gain (___ Lbs)] : no recent weight gain [Recent Weight Loss (___ Lbs)] : no recent weight loss [Chest Pain] : no chest pain [Shortness Of Breath] : shortness of breath

## 2021-05-11 NOTE — HISTORY OF PRESENT ILLNESS
[FreeTextEntry1] : Follow up DM and hyperlipidemia\par \par Quality:  type 2 DM\par Severity:  moderate\par Duration of diabetes:  since the 1990s\par Associated Complications/ Symptoms:  nephropathy, neuropathy, had DKA in setting of prednisone use\par Modifying Factors:  Better with medication, worsened by prednisone use for gout.  \par \par SMBG:  testing 4 times a day. \par Now using Freestyle Shirin CGM.  Having inaccurate values on current sensor today.  When he arrived to office, Shirin said low, but fingerstick was 291. \par Reviewed Download:  Average BG is 160 mg/dl with CV of 29%.  69% of values are within target range, 3 % below range and 28% above range.   \par \par Current Diabetic Medication Regimen:\par Touejo 28 units qAM  \par Humalog 16 units with meals + scale (2:50 over 150)\par Came off Januvia in the past due to cost.  \par  \par

## 2021-05-11 NOTE — PHYSICAL EXAM
[Obese] : obese [No Acute Distress] : no acute distress [Normal Sclera/Conjunctiva] : normal sclera/conjunctiva [No Proptosis] : no proptosis [No Neck Mass] : no neck mass was observed [No LAD] : no lymphadenopathy [Supple] : the neck was supple [Thyroid Not Enlarged] : the thyroid was not enlarged [No Thyroid Nodules] : no palpable thyroid nodules [No Respiratory Distress] : no respiratory distress [Clear to Auscultation] : lungs were clear to auscultation bilaterally [Normal S1, S2] : normal S1 and S2 [No Murmurs] : no murmurs [Normal Rate] : heart rate was normal [Regular Rhythm] : with a regular rhythm [Acanthosis Nigricans] : no acanthosis nigricans [Normal Affect] : the affect was normal [Normal Insight/Judgement] : insight and judgment were intact [Normal Mood] : the mood was normal

## 2021-05-11 NOTE — ASSESSMENT
[FreeTextEntry1] : 75 year old male with type 2 DM with associated nephropathy, HTN and hyperlipidemia. His glycemic control is reasonable. \par \par 1. Type 2 DM  Check A1c now.   Continue current insulin doses.  Advised patient to change current Shirin Sensor as it is reading inaccurately.    Labs drawn in office.   \par 2. Hyperlipidemia- continue Atorvastatin.   Check lipids.   \par 3. HTN- continue Losartan

## 2021-05-17 LAB
ALBUMIN SERPL ELPH-MCNC: 4 G/DL
ALP BLD-CCNC: 77 U/L
ALT SERPL-CCNC: 16 U/L
ANION GAP SERPL CALC-SCNC: 15 MMOL/L
AST SERPL-CCNC: 17 U/L
BASOPHILS # BLD AUTO: 0.06 K/UL
BASOPHILS NFR BLD AUTO: 0.5 %
BILIRUB SERPL-MCNC: 0.4 MG/DL
BUN SERPL-MCNC: 64 MG/DL
CALCIUM SERPL-MCNC: 9.5 MG/DL
CHLORIDE SERPL-SCNC: 101 MMOL/L
CHOLEST SERPL-MCNC: 108 MG/DL
CO2 SERPL-SCNC: 26 MMOL/L
CREAT SERPL-MCNC: 2.35 MG/DL
EOSINOPHIL # BLD AUTO: 0.47 K/UL
EOSINOPHIL NFR BLD AUTO: 3.8 %
ESTIMATED AVERAGE GLUCOSE: 183 MG/DL
GLUCOSE SERPL-MCNC: 316 MG/DL
HBA1C MFR BLD HPLC: 8 %
HCT VFR BLD CALC: 44.4 %
HDLC SERPL-MCNC: 44 MG/DL
HGB BLD-MCNC: 13.7 G/DL
IMM GRANULOCYTES NFR BLD AUTO: 0.7 %
LDLC SERPL CALC-MCNC: 38 MG/DL
LYMPHOCYTES # BLD AUTO: 1.71 K/UL
LYMPHOCYTES NFR BLD AUTO: 13.8 %
MAN DIFF?: NORMAL
MCHC RBC-ENTMCNC: 30.9 GM/DL
MCHC RBC-ENTMCNC: 31.3 PG
MCV RBC AUTO: 101.4 FL
MONOCYTES # BLD AUTO: 0.88 K/UL
MONOCYTES NFR BLD AUTO: 7.1 %
NEUTROPHILS # BLD AUTO: 9.16 K/UL
NEUTROPHILS NFR BLD AUTO: 74.1 %
NONHDLC SERPL-MCNC: 63 MG/DL
PLATELET # BLD AUTO: 211 K/UL
POTASSIUM SERPL-SCNC: 5.5 MMOL/L
PROT SERPL-MCNC: 6.6 G/DL
RBC # BLD: 4.38 M/UL
RBC # FLD: 14.5 %
SODIUM SERPL-SCNC: 142 MMOL/L
TRIGL SERPL-MCNC: 125 MG/DL
TSH SERPL-ACNC: 1.54 UIU/ML
WBC # FLD AUTO: 12.37 K/UL

## 2021-05-24 ENCOUNTER — NON-APPOINTMENT (OUTPATIENT)
Age: 76
End: 2021-05-24

## 2021-08-09 RX ORDER — PEN NEEDLE, DIABETIC 32GX 5/32"
32G X 4 MM NEEDLE, DISPOSABLE MISCELLANEOUS
Qty: 400 | Refills: 1 | Status: ACTIVE | COMMUNITY
Start: 2020-11-02 | End: 1900-01-01

## 2021-10-06 PROBLEM — I10 ESSENTIAL HYPERTENSION: Status: ACTIVE | Noted: 2019-03-26

## 2021-10-12 ENCOUNTER — APPOINTMENT (OUTPATIENT)
Dept: ENDOCRINOLOGY | Facility: CLINIC | Age: 76
End: 2021-10-12

## 2021-10-21 RX ORDER — BLOOD SUGAR DIAGNOSTIC
STRIP MISCELLANEOUS
Qty: 200 | Refills: 0 | Status: ACTIVE | COMMUNITY
Start: 2019-04-11 | End: 1900-01-01

## 2021-10-21 RX ORDER — BLOOD SUGAR DIAGNOSTIC
STRIP MISCELLANEOUS
Qty: 9 | Refills: 0 | Status: ACTIVE | COMMUNITY
Start: 2021-10-21 | End: 1900-01-01

## 2021-11-16 ENCOUNTER — NON-APPOINTMENT (OUTPATIENT)
Age: 76
End: 2021-11-16

## 2022-01-03 ENCOUNTER — NON-APPOINTMENT (OUTPATIENT)
Age: 77
End: 2022-01-03

## 2022-07-08 NOTE — HISTORY OF PRESENT ILLNESS
[FreeTextEntry1] : Patient is seen today for a routine diabetic follow up.\par Quality:  type 2 DM\par Severity:  moderate\par Duration of diabetes:  since the 1990s\par Associated Complications/ Symptoms:  nephropathy, neuropathy, had DKA in setting of prednisone use\par Modifying Factors:  Better with medication\par \par SMBG:  testing 3-4 times a day and most BG in the 200- 300 mg/dl range.  \par \par Current Diabetic Medication Regimen:\par Touejo 26 units qAM  \par Humalog 14 units with meals\par Came off Januvia in the past due to cost.  \par \par Back on prednisone 10 mg daily for gout.
PAST MEDICAL HISTORY:  No pertinent past medical history